# Patient Record
Sex: MALE | URBAN - METROPOLITAN AREA
[De-identification: names, ages, dates, MRNs, and addresses within clinical notes are randomized per-mention and may not be internally consistent; named-entity substitution may affect disease eponyms.]

---

## 2018-07-23 ENCOUNTER — RECORDS - HEALTHEAST (OUTPATIENT)
Dept: LAB | Facility: HOSPITAL | Age: 23
End: 2018-07-23

## 2018-07-24 LAB — HBV SURFACE AB SERPL IA-ACNC: POSITIVE M[IU]/ML

## 2018-07-25 LAB
GAMMA INTERFERON BACKGROUND BLD IA-ACNC: 0.12 IU/ML
M TB IFN-G BLD-IMP: NEGATIVE
MITOGEN IGNF BCKGRD COR BLD-ACNC: 0.02 IU/ML
MITOGEN IGNF BCKGRD COR BLD-ACNC: 0.02 IU/ML
QTF INTERPRETATION: NORMAL
QTF MITOGEN - NIL: >10 IU/ML

## 2018-09-11 ENCOUNTER — NURSE TRIAGE (OUTPATIENT)
Dept: NURSING | Facility: CLINIC | Age: 23
End: 2018-09-11

## 2018-09-12 NOTE — TELEPHONE ENCOUNTER
"Reports a \"pretty big stye\" on L upper eyelid. Says it has been present for about 1 month and is very painful.  Denies redness or swelling of the rest of the eyelid or the lower lid. Has not checked temp. Says he has blurred vision since he got this stye. Advised see provider within 4 hours per guideline. Disc'd he will need to go to a hospital ED; all other facilities closed now (11:15 pm) until tomorrow AM Pt asked if there would be a doctor at the ED that could remove the stye. Advised pt it is  unlikely that the stye can be removed at the ED but he should be seen in case an abx or drainage is needed. If removal is needed he would likely be referred to a surgeon. Pt voiced understanding and agreement.Ernestine Fallon RN/FNA      Reason for Disposition    [1] Blurred vision AND [2] new or worsening    Additional Information    Negative: Patient sounds very sick or weak to the triager    Negative: [1] Eyelid is red AND [2] fever    Negative: [1] Eyelid is swollen AND [2] fever    Negative: Redness spreads around the eye (both upper and lower eyelid are red)    Protocols used: STY-ADULT-    "

## 2018-12-27 ENCOUNTER — HOSPITAL ENCOUNTER (EMERGENCY)
Facility: CLINIC | Age: 23
Discharge: PSYCHIATRIC OR SUBSTANCE USE FACILITY | End: 2018-12-27
Attending: EMERGENCY MEDICINE | Admitting: EMERGENCY MEDICINE
Payer: MEDICAID

## 2018-12-27 ENCOUNTER — HOSPITAL ENCOUNTER (INPATIENT)
Facility: CLINIC | Age: 23
LOS: 1 days | Discharge: NURSING, REHAB OR LONG-TERM CARE | End: 2018-12-28
Attending: PSYCHIATRY & NEUROLOGY | Admitting: PSYCHIATRY & NEUROLOGY
Payer: MEDICAID

## 2018-12-27 VITALS
SYSTOLIC BLOOD PRESSURE: 133 MMHG | HEART RATE: 75 BPM | HEIGHT: 74 IN | OXYGEN SATURATION: 98 % | TEMPERATURE: 98.1 F | RESPIRATION RATE: 18 BRPM | DIASTOLIC BLOOD PRESSURE: 89 MMHG

## 2018-12-27 DIAGNOSIS — F33.1 MAJOR DEPRESSIVE DISORDER, RECURRENT EPISODE, MODERATE (H): Primary | ICD-10-CM

## 2018-12-27 DIAGNOSIS — T65.92XA SUICIDE ATTEMPT BY SUBSTANCE OVERDOSE, INITIAL ENCOUNTER (H): ICD-10-CM

## 2018-12-27 LAB
ALBUMIN SERPL-MCNC: 3.9 G/DL (ref 3.4–5)
ALP SERPL-CCNC: 101 U/L (ref 40–150)
ALT SERPL W P-5'-P-CCNC: 17 U/L (ref 0–70)
AMPHETAMINES UR QL SCN: NEGATIVE
ANION GAP SERPL CALCULATED.3IONS-SCNC: 9 MMOL/L (ref 3–14)
APAP SERPL-MCNC: <2 MG/L (ref 10–20)
AST SERPL W P-5'-P-CCNC: 17 U/L (ref 0–45)
BARBITURATES UR QL: NEGATIVE
BASOPHILS # BLD AUTO: 0 10E9/L (ref 0–0.2)
BASOPHILS NFR BLD AUTO: 0.5 %
BENZODIAZ UR QL: NEGATIVE
BILIRUB SERPL-MCNC: 2.2 MG/DL (ref 0.2–1.3)
BUN SERPL-MCNC: 10 MG/DL (ref 7–30)
CALCIUM SERPL-MCNC: 8.9 MG/DL (ref 8.5–10.1)
CANNABINOIDS UR QL SCN: POSITIVE
CHLORIDE SERPL-SCNC: 106 MMOL/L (ref 94–109)
CO2 SERPL-SCNC: 26 MMOL/L (ref 20–32)
COCAINE UR QL: NEGATIVE
CREAT SERPL-MCNC: 0.89 MG/DL (ref 0.66–1.25)
DIFFERENTIAL METHOD BLD: NORMAL
EOSINOPHIL # BLD AUTO: 0.2 10E9/L (ref 0–0.7)
EOSINOPHIL NFR BLD AUTO: 3.9 %
ERYTHROCYTE [DISTWIDTH] IN BLOOD BY AUTOMATED COUNT: 12 % (ref 10–15)
GFR SERPL CREATININE-BSD FRML MDRD: >90 ML/MIN/{1.73_M2}
GLUCOSE SERPL-MCNC: 100 MG/DL (ref 70–99)
HCT VFR BLD AUTO: 44.4 % (ref 40–53)
HGB BLD-MCNC: 15.5 G/DL (ref 13.3–17.7)
IMM GRANULOCYTES # BLD: 0 10E9/L (ref 0–0.4)
IMM GRANULOCYTES NFR BLD: 0.2 %
INTERPRETATION ECG - MUSE: NORMAL
LYMPHOCYTES # BLD AUTO: 2.1 10E9/L (ref 0.8–5.3)
LYMPHOCYTES NFR BLD AUTO: 36.3 %
MCH RBC QN AUTO: 31.1 PG (ref 26.5–33)
MCHC RBC AUTO-ENTMCNC: 34.9 G/DL (ref 31.5–36.5)
MCV RBC AUTO: 89 FL (ref 78–100)
MONOCYTES # BLD AUTO: 0.5 10E9/L (ref 0–1.3)
MONOCYTES NFR BLD AUTO: 8.7 %
NEUTROPHILS # BLD AUTO: 2.8 10E9/L (ref 1.6–8.3)
NEUTROPHILS NFR BLD AUTO: 50.4 %
OPIATES UR QL SCN: POSITIVE
PCP UR QL SCN: NEGATIVE
PLATELET # BLD AUTO: 223 10E9/L (ref 150–450)
POTASSIUM SERPL-SCNC: 4 MMOL/L (ref 3.4–5.3)
PROT SERPL-MCNC: 7.1 G/DL (ref 6.8–8.8)
RBC # BLD AUTO: 4.98 10E12/L (ref 4.4–5.9)
SALICYLATES SERPL-MCNC: <2 MG/DL
SODIUM SERPL-SCNC: 141 MMOL/L (ref 133–144)
WBC # BLD AUTO: 5.6 10E9/L (ref 4–11)

## 2018-12-27 PROCEDURE — 12400007 ZZH R&B MH INTERMEDIATE UMMC

## 2018-12-27 PROCEDURE — 25000132 ZZH RX MED GY IP 250 OP 250 PS 637: Performed by: PSYCHIATRY & NEUROLOGY

## 2018-12-27 PROCEDURE — 80307 DRUG TEST PRSMV CHEM ANLYZR: CPT | Performed by: EMERGENCY MEDICINE

## 2018-12-27 PROCEDURE — 93005 ELECTROCARDIOGRAM TRACING: CPT

## 2018-12-27 PROCEDURE — 25000132 ZZH RX MED GY IP 250 OP 250 PS 637: Performed by: NURSE PRACTITIONER

## 2018-12-27 PROCEDURE — 25000132 ZZH RX MED GY IP 250 OP 250 PS 637: Performed by: EMERGENCY MEDICINE

## 2018-12-27 PROCEDURE — 80053 COMPREHEN METABOLIC PANEL: CPT | Performed by: EMERGENCY MEDICINE

## 2018-12-27 PROCEDURE — 85025 COMPLETE CBC W/AUTO DIFF WBC: CPT | Performed by: EMERGENCY MEDICINE

## 2018-12-27 PROCEDURE — 99207 ZZC CDG-CODE CATEGORY CHANGED: CPT | Performed by: PSYCHIATRY & NEUROLOGY

## 2018-12-27 PROCEDURE — 99285 EMERGENCY DEPT VISIT HI MDM: CPT | Mod: 25

## 2018-12-27 PROCEDURE — 90791 PSYCH DIAGNOSTIC EVALUATION: CPT

## 2018-12-27 PROCEDURE — 80329 ANALGESICS NON-OPIOID 1 OR 2: CPT | Performed by: EMERGENCY MEDICINE

## 2018-12-27 PROCEDURE — 99236 HOSP IP/OBS SAME DATE HI 85: CPT | Performed by: PSYCHIATRY & NEUROLOGY

## 2018-12-27 RX ORDER — ESCITALOPRAM OXALATE 5 MG/1
5 TABLET ORAL DAILY
Status: DISCONTINUED | OUTPATIENT
Start: 2018-12-27 | End: 2018-12-28 | Stop reason: HOSPADM

## 2018-12-27 RX ORDER — OLANZAPINE 10 MG/2ML
10 INJECTION, POWDER, FOR SOLUTION INTRAMUSCULAR
Status: DISCONTINUED | OUTPATIENT
Start: 2018-12-27 | End: 2018-12-28 | Stop reason: HOSPADM

## 2018-12-27 RX ORDER — BISACODYL 10 MG
10 SUPPOSITORY, RECTAL RECTAL DAILY PRN
Status: DISCONTINUED | OUTPATIENT
Start: 2018-12-27 | End: 2018-12-28 | Stop reason: HOSPADM

## 2018-12-27 RX ORDER — TRAZODONE HYDROCHLORIDE 50 MG/1
50 TABLET, FILM COATED ORAL
Status: DISCONTINUED | OUTPATIENT
Start: 2018-12-27 | End: 2018-12-28 | Stop reason: HOSPADM

## 2018-12-27 RX ORDER — NALOXONE HYDROCHLORIDE 0.4 MG/ML
.1-.4 INJECTION, SOLUTION INTRAMUSCULAR; INTRAVENOUS; SUBCUTANEOUS
Status: DISCONTINUED | OUTPATIENT
Start: 2018-12-27 | End: 2018-12-28 | Stop reason: HOSPADM

## 2018-12-27 RX ORDER — ALUMINA, MAGNESIA, AND SIMETHICONE 2400; 2400; 240 MG/30ML; MG/30ML; MG/30ML
30 SUSPENSION ORAL EVERY 4 HOURS PRN
Status: DISCONTINUED | OUTPATIENT
Start: 2018-12-27 | End: 2018-12-28 | Stop reason: HOSPADM

## 2018-12-27 RX ORDER — ESCITALOPRAM OXALATE 5 MG/1
5 TABLET ORAL DAILY
Qty: 30 TABLET | Refills: 0 | Status: SHIPPED | OUTPATIENT
Start: 2018-12-27 | End: 2019-01-26

## 2018-12-27 RX ORDER — ACETAMINOPHEN 325 MG/1
650 TABLET ORAL ONCE
Status: COMPLETED | OUTPATIENT
Start: 2018-12-27 | End: 2018-12-27

## 2018-12-27 RX ORDER — NICOTINE 21 MG/24HR
1 PATCH, TRANSDERMAL 24 HOURS TRANSDERMAL DAILY
Status: DISCONTINUED | OUTPATIENT
Start: 2018-12-27 | End: 2018-12-27

## 2018-12-27 RX ORDER — ACETAMINOPHEN 325 MG/1
650 TABLET ORAL EVERY 4 HOURS PRN
Status: DISCONTINUED | OUTPATIENT
Start: 2018-12-27 | End: 2018-12-28 | Stop reason: HOSPADM

## 2018-12-27 RX ORDER — OLANZAPINE 10 MG/1
10 TABLET ORAL
Status: DISCONTINUED | OUTPATIENT
Start: 2018-12-27 | End: 2018-12-28 | Stop reason: HOSPADM

## 2018-12-27 RX ORDER — LANOLIN ALCOHOL/MO/W.PET/CERES
3 CREAM (GRAM) TOPICAL
Status: DISCONTINUED | OUTPATIENT
Start: 2018-12-27 | End: 2018-12-27 | Stop reason: HOSPADM

## 2018-12-27 RX ORDER — HYDROXYZINE HYDROCHLORIDE 25 MG/1
25 TABLET, FILM COATED ORAL EVERY 4 HOURS PRN
Status: DISCONTINUED | OUTPATIENT
Start: 2018-12-27 | End: 2018-12-28 | Stop reason: HOSPADM

## 2018-12-27 RX ORDER — BUPRENORPHINE 2 MG/1
2 TABLET SUBLINGUAL 2 TIMES DAILY
Status: COMPLETED | OUTPATIENT
Start: 2018-12-27 | End: 2018-12-28

## 2018-12-27 RX ADMIN — ESCITALOPRAM 5 MG: 5 TABLET, FILM COATED ORAL at 16:14

## 2018-12-27 RX ADMIN — MELATONIN 3 MG: 3 TAB ORAL at 04:00

## 2018-12-27 RX ADMIN — BUPRENORPHINE HYDROCHLORIDE 2 MG: 2 TABLET SUBLINGUAL at 22:00

## 2018-12-27 RX ADMIN — NICOTINE POLACRILEX 2 MG: 2 GUM, CHEWING BUCCAL at 20:21

## 2018-12-27 RX ADMIN — TRAZODONE HYDROCHLORIDE 50 MG: 50 TABLET ORAL at 22:00

## 2018-12-27 RX ADMIN — Medication 4 MG: at 18:23

## 2018-12-27 RX ADMIN — NICOTINE POLACRILEX 4 MG: 2 GUM, CHEWING BUCCAL at 22:00

## 2018-12-27 RX ADMIN — ACETAMINOPHEN 650 MG: 325 TABLET, FILM COATED ORAL at 01:23

## 2018-12-27 RX ADMIN — NICOTINE 1 PATCH: 21 PATCH, EXTENDED RELEASE TRANSDERMAL at 16:14

## 2018-12-27 RX ADMIN — BUPRENORPHINE HYDROCHLORIDE 2 MG: 2 TABLET SUBLINGUAL at 17:05

## 2018-12-27 SDOH — HEALTH STABILITY: MENTAL HEALTH: HOW OFTEN DO YOU HAVE A DRINK CONTAINING ALCOHOL?: NEVER

## 2018-12-27 ASSESSMENT — ACTIVITIES OF DAILY LIVING (ADL)
RETIRED_COMMUNICATION: 0-->UNDERSTANDS/COMMUNICATES WITHOUT DIFFICULTY
AMBULATION: 0-->INDEPENDENT
TOILETING: 0-->INDEPENDENT
COGNITION: 0 - NO COGNITION ISSUES REPORTED
HYGIENE/GROOMING: INDEPENDENT
DRESS: 0-->INDEPENDENT
LAUNDRY: WITH SUPERVISION
RETIRED_EATING: 0-->INDEPENDENT
ORAL_HYGIENE: INDEPENDENT
SWALLOWING: 0-->SWALLOWS FOODS/LIQUIDS WITHOUT DIFFICULTY
BATHING: 0-->INDEPENDENT
FALL_HISTORY_WITHIN_LAST_SIX_MONTHS: NO
DRESS: INDEPENDENT
TRANSFERRING: 0-->INDEPENDENT

## 2018-12-27 ASSESSMENT — ENCOUNTER SYMPTOMS
LIGHT-HEADEDNESS: 1
ABDOMINAL PAIN: 1

## 2018-12-27 ASSESSMENT — MIFFLIN-ST. JEOR: SCORE: 1783.24
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
## 2018-12-27 NOTE — ED NOTES
"I assumed care of pt at this time. Per report, pt took a total of 6 pills of 10 MG Oxycodone in an attempt to harm himself and left a note for his daughter. Pt was placed on a health officer hold. Pt has remained VSS and has been ambulating in the [facility] area with a steady gait. Pt has been calm and compliant with care and will make his wishes known as needed. Pt would like something to help him sleep at this time but is waiting to talk to DEC.  To be completed: Pt has not given a urine sample yet.  Current Vitals: BP (!) 149/107   Pulse 117   Temp 98.1  F (36.7  C) (Oral)   Resp 18   Ht 1.88 m (6' 2\")   SpO2 96%   Tele DEC: On the waiting list.  Belonging checklist: Completed.  Disposition: Not determined at this time.  "

## 2018-12-27 NOTE — PROGRESS NOTES
12/27/18 0600   Patient Belongings   Did you bring any home meds/supplements to the hospital?  No   Patient Belongings locker   Patient Belongings Put in Hospital Secure Location (Security or Locker, etc.) cash/credit card;clothing;keys;wallet;shoes;cell phone/electronics;money (see comment)   Belongings Search Yes   Clothing Search Yes   Second Staff Chai HYDE   Comment Envelope #[id number] sent to security     Items kept in storage  Jacket, Pants with string, Shoes, Wallet, Cell phone, Set of keys,     Items sent to security  [place] Drivers License, 5 Visa's, 2 Master Card, 1 Capital one, Social Security Card, $ 5.00 (five dollars)    Admission Signature    Patient Signature      __________________    Staff Signature      ___________________    Discharge Signature    All my personal items were returned to me    Patient Signature      _________________    Staff Signature      _________________

## 2018-12-27 NOTE — ED NOTES
"Pt was informed that he will be admitted to [facility] and asked to sign his agreement for transportation to [facility] ([facility] form). Pt became agitated stating that he doesn't want to go to [facility] and wants a bed here at [facility]. Pt demanding to speak with \"the psychologist that I just spoke to before I can make a decision on what to do.\" Pt also states that he wants to contact his family, but not at the moment \"because it 4 in the morning and they are sleeping.\"  Pt informed that MD will be notified. Pt states \"okay\".  "

## 2018-12-27 NOTE — ED NOTES
Bed: [facility]  Expected date:   Expected time:   Means of arrival:   Comments:  Triage
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
## 2018-12-27 NOTE — ED NOTES
Tele DEC assessment in progress.  Una Moffett RN,.......................................... 12/27/2018   3:20 AM

## 2018-12-27 NOTE — PROGRESS NOTES
I will be off tomorrow and have tried to find resources for pt without having insurance.  Pt tells me he has been living in his car in [place].  I will pass info on to coverage worker to finish this AVS.         Behavioral Discharge Planning and Instructions      Summary:  You were admitted on 12/27/2018  due to taking oxycodone in a possible suicide attempt.  You were treated by Dr. Rajesh Alfaro MD. You reported that you did not want to die, you just wanted to sleep to ease the emotional pain that you have been having.  You were discharged on 12/27/2018 from [facility] to Shelter Services at the [facility].      Principal Diagnosis:           Health Care Follow-up Appointments:      You came in without any insurance.   You met with a Financial Counselor, Ingrid Del Castillo. The Financial Counseling Office is [phone]. Since you had not started working yet, your income was submitted as 0 for your medical assistance application. After you start working and your income changes you will need to call [employer] @ [phone] and report the change in income. You are now eligible for medical assistance but it not yet activated.      You want outpatient substance use treatment. Walk in to this treatment center to get a Rule 25 assessment.  [facility] [address] [facility] [phone] Stand - By  Monday - Friday 7AM to 2PM         Dr. Alfaro showed you how to find a suboxone provider using the internet.      You can get therapy and medication management services at the local mental health center.  [facility]  [address]  2nd and [place]rd [place] [address]  General Information: [phone]  Appointments: [phone]  If you need to get in earlier, call the nurse line - [phone].      It is recommended that you call and schedule an appointment to establish care with a primary care provider.  [facility]  [address]  [place] [address]  Phone: [phone]      Attend all scheduled appointments with your outpatient providers. Call at least 24 hours in advance if you need to reschedule an appointment to ensure continued access to your outpatient providers.   Major Treatments, Procedures and Findings:  You were provided with: a psychiatric assessment, medication evaluation and/or management, group therapy and milieu management    Your drug screen was positive for opiates and cannabis.    Cannabinoids  Qual Urine Positive Abnormal   A  NEG^Negative  Final 12/27/2018  3:41 AM FrStHsLb     Opiates Qualitative Urine Positive Abnormal   A  NEG^Negative Final 12/27/2018  3:41 AM FrStHsLb         Symptoms to Report: mood getting worse or thoughts of suicide    Early warning signs can include: increased thoughts or behaviors of suicide or self-harm     Safety and Wellness:  Take all medicines as directed.  Make no changes unless your doctor suggests them.      Follow treatment recommendations.  Refrain from alcohol and non-prescribed drugs.  If there is a concern for safety, call 911.    Resources:   You want housing services. Walk in to this resource as soon as possible.  [facility]  At [address] in [facility]  Entrance on the intersection of [facility] [address]  Monday - Friday: 10:00am-5:30pm  Weekend and holidays: 1:00-5:30pm  [phone] - Nightly after 7:30 p.m. call [phone]      You were given a Handbook of the Streets to use as a Resource Guide.      COPE (Community Outreach for Psychiatric Emergencies): [phone]. s: 24 hours/day, 7 days/week  Services: Free and confidential telephone counseling provided by trained volunteers supervised by professional staff. Early intervention and brief supportive counseling for callers with issues of depression, stress and anxiety, domestic violence, parent/child conflicts, family issues, loneliness, grief and loss,  suicidal ideation and chronic mental illness.      [facility]  - [facility]  1.  [facility] Stabilization Program  [address] [place] [address]  Phone:[phone]    2.  [facility]  [address] [place] [address]  Phone: [phone]  This is a crisis residence where you can stay for a short time if you feel like you need to stabilize but do not need to go to the hospital.    3.  [facility]  [address] [place] [address]  Phone: [phone]        The treatment team has appreciated the opportunity to work with you.     If you have any questions or concerns our unit number is [phone]  You may be receiving a follow-up phone call within the next three days from a representative from behavioral health.    You have identified the best phone number to reach you

## 2018-12-27 NOTE — PLAN OF CARE
"Patient was admitted to [facility] for suicide attempt via overdose of 60 mg of Oxycodone. Patient reports abuse of opiates but denies any addiction and states he rarely uses \"maybe twice in the last 3 months before yesterday (12/26)\" but also requested Suboxone to alleviate withdrawal symptoms. Patient denies SI/SIB and hallucinations, states that he would \"never kill myself because I would go to hell.\" Patient states he \"just wanted to go to sleep\" but reports that he \"said goodbye\" to his mother. Recent stressors include separation from his wife, employment difficulties and unstable living situation.     Mental Health History: patient denies any prior mental health history and reports seeing a counselor briefly while a sophomore in college when he was \"stressed\" before his wedding.       Medical History: patient reports history of back pain for which he was prescribed opiates in the past and reports this is when he began abusing oxycodone, denies any pain at this time      Plan: Patient was cooperative with search and admission process. Patient was calm and presented with full range affect during admission interview. Patient was placed on suicide precautions and opiate withdrawal protocol. Patient states he is seeking \"a counselor or outside services.\" Patient is code 1 status 15. Will encourage patient to complete safety plan and participate in groups. Will encourage patient to take prescribed medications.   "

## 2018-12-27 NOTE — ED PROVIDER NOTES
"  History     Chief Complaint:  Overdose/Suicidal ideation    HPI   Domenic Hargrove is a 23 year old male who presents with overdose/suicidal ideation. The patient has had a bad year and notes having suicidal ideations for the first time ever this evening. He had smoked weed earlier today. This evening he had some Oxycodone (with no tylenol or other drugs) and decided to take 60 mg at 1900. He ended up falling asleep and texting his wife. Upon waking he decided to drive himself to the ED for evaluation and treatment out of concern for overdose. Upon arrival, he reports lightheadedness and abdominal pain. He denies any other drugs or alcohol use. He denies any pertinent history, allergies, or recent surgeries.    Allergies:  The patient has no known drug allergies.    Medications:    The patient denies any significant past medical history.    Past Medical History:    The patient denies any significant past medical history.    Past Surgical History:    The patient does not have any pertinent past surgical history.    Family History:    No past pertinent family history.    Social History:  Marital Status:  Unknown   Smoker:   Current   Smokeless:   Never   Alcohol:   No   Drugs:   No   Lives at:   Unknown   Arrives with:   No one   Clinic:    Unknown   Work:   Unknown     Review of Systems   Gastrointestinal: Positive for abdominal pain.   Neurological: Positive for light-headedness.   Psychiatric/Behavioral: Positive for suicidal ideas.   All other systems reviewed and are negative.    Physical Exam     Patient Vitals for the past 24 hrs:   BP Temp Temp src Pulse Heart Rate Resp SpO2 Height   12/27/18 0529 133/89 -- -- -- 75 -- 98 % --   12/27/18 0338 133/89 -- -- 75 -- 18 98 % --   12/27/18 0101 (!) 149/107 98.1  F (36.7  C) Oral 117 -- 18 96 % 1.88 m (6' 2\")     Physical Exam  General: Appears well-developed and well-nourished.   Head: No signs of trauma.   CV: Normal rate and regular rhythm.    Resp: Effort normal " and breath sounds normal. No respiratory distress.   GI: Soft. There is no tenderness.  No rebound or guarding.  Normal bowel sounds.    MSK: Normal range of motion.  Neuro: The patient is alert and oriented.  Strength in upper/lower extremities normal and symmetrical.   Sensation normal. Speech normal.  GCS 15  Skin: Skin is warm and dry. No rash noted.   Psych: Calm and cooperative      Emergency Department Course   ECG:  Indication: overdose  Time: 0146  Vent. Rate 89 bpm. IL interval 156. QRS duration 104. QT/QTc 348/423. P-R-T axis 74 82 68. Normal sinus rhythm. Voltage criteria for left ventricular hypertrophy. Abnormal ECG. Read time: 0150    Laboratory:  Drug abuse screen: cannabinoids Positive, opiates Positive    Salicylate level: <2  Acetaminophen: <2  CMP: Glucose 100, bilirubin 2.2, o/w WNL (Creatinine: 0.89)  CBC: WBC: 5.6, HGB: 15.5, PLT: 223    Interventions:  0123: Tylenol 650 mg PO     Emergency Department Course:  Nursing notes and vitals reviewed. (0105) I performed an exam of the patient as documented above.     IV inserted. Medicine administered as documented above. Blood drawn. This was sent to the lab for further testing, results above.    (0257) I consulted with DEC, regarding the patient's history and presentation here in the emergency department.  (0047)  I consulted with Dr. Alfaro of [facility]. They are in agreement to accept the patient for admission.     Findings and plan explained to the Patient who consents to admission. Discussed the patient with Dr. Alfaro, who will admit the patient to a psych bed for further monitoring, evaluation, and treatment.    Impression & Plan    Medical Decision Making:  Domenic Hargrove is a 23-year-old gentleman who presents after an intentional overdose.  The patient reports he has been dealing with a number of stressors and felt overwhelmed this evening.  He did intentionally take 6 - 10 mg tablets of oxycodone around 1900 this evening.   Apparently he had made some concerning statements to a family member.  The patient states that he had slept for a while after taking medications and when he woke up he was concerned about his actions and was brought himself to the ER.  Blood work was obtained that was unremarkable including a negative salicylate and Tylenol level.  The patient was clear that he had taken oxycodone without any Tylenol and given that the Tylenol was negative 6 hours after ingestion, I have a low concern for Tylenol toxicity.  DEC did evaluate the patient and ultimately decided to admit the patient for psychiatric evaluation.      Diagnosis:    ICD-10-CM    1. Suicide attempt by substance overdose, initial encounter (H) T65.92XA      Disposition:  Transferred to [facility] with Dr. Dominic Schuster Disclosure:  IMichele, am serving as a scribe on 12/27/2018 at 1:05 AM to personally document services performed by Justin Vasquez MD based on my observations and the provider's statements to me.     Michele Romano  12/27/2018    EMERGENCY DEPARTMENT       Justin Vasquez MD  12/28/18 0555

## 2018-12-28 VITALS
DIASTOLIC BLOOD PRESSURE: 80 MMHG | HEIGHT: 75 IN | TEMPERATURE: 97.2 F | HEART RATE: 74 BPM | SYSTOLIC BLOOD PRESSURE: 124 MMHG | WEIGHT: 156 LBS | BODY MASS INDEX: 19.4 KG/M2 | RESPIRATION RATE: 16 BRPM

## 2018-12-28 LAB
CHOLEST SERPL-MCNC: 152 MG/DL
HDLC SERPL-MCNC: 70 MG/DL
LDLC SERPL CALC-MCNC: 68 MG/DL
NONHDLC SERPL-MCNC: 82 MG/DL
T4 FREE SERPL-MCNC: 0.96 NG/DL (ref 0.76–1.46)
TRIGL SERPL-MCNC: 68 MG/DL
TSH SERPL DL<=0.005 MIU/L-ACNC: 0.35 MU/L (ref 0.4–4)

## 2018-12-28 PROCEDURE — 25000132 ZZH RX MED GY IP 250 OP 250 PS 637: Performed by: PSYCHIATRY & NEUROLOGY

## 2018-12-28 PROCEDURE — 84439 ASSAY OF FREE THYROXINE: CPT | Performed by: NURSE PRACTITIONER

## 2018-12-28 PROCEDURE — 84443 ASSAY THYROID STIM HORMONE: CPT | Performed by: NURSE PRACTITIONER

## 2018-12-28 PROCEDURE — 80061 LIPID PANEL: CPT | Performed by: NURSE PRACTITIONER

## 2018-12-28 PROCEDURE — 36415 COLL VENOUS BLD VENIPUNCTURE: CPT | Performed by: NURSE PRACTITIONER

## 2018-12-28 RX ADMIN — ESCITALOPRAM 5 MG: 5 TABLET, FILM COATED ORAL at 09:09

## 2018-12-28 RX ADMIN — BUPRENORPHINE HYDROCHLORIDE 2 MG: 2 TABLET SUBLINGUAL at 09:09

## 2018-12-28 RX ADMIN — NICOTINE POLACRILEX 4 MG: 2 GUM, CHEWING BUCCAL at 09:11

## 2018-12-28 NOTE — PLAN OF CARE
"Pt was discharged from stn: 30.  At the time of discharge, pt denies suicidal ideation and stated \"I will never do anything to harm myself\".  Pt has received all his  belongings.   "

## 2018-12-28 NOTE — PROGRESS NOTES
CTC: Obtained YUE from pt for psychiatry and psychotherapy follow-up. Pt reported he will call to schedule this appointment

## 2018-12-28 NOTE — DISCHARGE INSTRUCTIONS
Behavioral Discharge Planning and Instructions      Summary:  You were admitted on 12/27/2018  due to taking oxycodone in a possible suicide attempt.  You were treated by Dr. Rajesh Alfaro MD. You reported that you did not want to die, you just wanted to sleep to ease the emotional pain that you have been having.  You were discharged on 12/27/2018 from [facility] to Shelter Services at the [facility].    Principal Diagnosis:   Major depressive disorder-recurrent, moderate  Generalized anxiety disorder  Opiate use disorder, moderate  Cannabis use disorder, moderate      Health Care Follow-up Appointments:      You came in without any insurance.   You met with a Financial Counselor, Ingrid Del Castillo. The Financial Counseling Office is [phone]. Since you had not started working yet, your income was submitted as 0 for your medical assistance application. After you start working and your income changes you will need to call [employer] @ [phone] and report the change in income. You are now eligible for medical assistance but it not yet activated.    You want outpatient substance use treatment. Walk in to this treatment center to get a Rule 25 assessment.  [facility] [address] [facility] [phone] Stand - By  Monday - Friday 7AM to 2PM       Dr. Alfaro showed you how to find a suboxone provider using the internet.    You can get therapy and medication management services at the local mental health center.  [facility]  [address]  2nd and [place]rd [place] [address].    Mental Health Follow-Up:  [facility]   [place]: [address] [place] [address], [address] - [phone]  [place]: [address] [facility] - [phone]  Pt called and scheduled the following appointment:      [facility]  [phone]  [facility] crisis intervention program (24-hour hotline).   [facility]  [phone]    [address]  Outpatient mental health services for people covered by [place] [facility] Care Programs or private insurance. People without insurance may be eligible to receive services on a sliding fee schedule.    General Information: [phone]  Appointments: [phone]  If you need to get in earlier, call the nurse line - [phone].      It is recommended that you call and schedule an appointment to establish care with a primary care provider.  [facility]  [address] [place] [address]  Phone: [phone]      Attend all scheduled appointments with your outpatient providers. Call at least 24 hours in advance if you need to reschedule an appointment to ensure continued access to your outpatient providers.   Major Treatments, Procedures and Findings:  You were provided with: a psychiatric assessment, medication evaluation and/or management, group therapy and milieu management    Your drug screen was positive for opiates and cannabis.    Cannabinoids  Qual Urine Positive Abnormal   A  NEG^Negative  Final 12/27/2018  3:41 AM FrStHsLb     Opiates Qualitative Urine Positive Abnormal   A  NEG^Negative Final 12/27/2018  3:41 AM FrStHsLb         Symptoms to Report: mood getting worse or thoughts of suicide    Early warning signs can include: increased thoughts or behaviors of suicide or self-harm     Safety and Wellness:  Take all medicines as directed.  Make no changes unless your doctor suggests them.      Follow treatment recommendations.  Refrain from alcohol and non-prescribed drugs.  If there is a concern for safety, call 101.    Resources:   You want housing services. Walk in to this resource as soon as possible.  [facility]  At [address] in [facility]  Entrance on the intersection of [address]  Monday - Friday: 10:00am-5:30pm  Weekend and holidays: 1:00-5:30pm  [phone] - Nightly after  7:30 p.m. call [phone]      You were given a Handbook of the Streets to use as a Resource Guide.      COPE (Community Outreach for Psychiatric Emergencies): [phone]. s: 24 hours/day, 7 days/week  Services: Free and confidential telephone counseling provided by trained volunteers supervised by professional staff. Early intervention and brief supportive counseling for callers with issues of depression, stress and anxiety, domestic violence, parent/child conflicts, family issues, loneliness, grief and loss, suicidal ideation and chronic mental illness.      [facility]  - [facility]  1.  [facility] Crisis Stabilization Program  [address] [place] [address]  Phone:[phone]    2.  [facility]  [address] [place] [address]  Phone: [phone]  This is a crisis residence where you can stay for a short time if you feel like you need to stabilize but do not need to go to the hospital.    3.  [facility]  [address] [place] [address]  Phone: [phone]        The treatment team has appreciated the opportunity to work with you.     If you have any questions or concerns our unit number is [phone]  You may be receiving a follow-up phone call within the next three days from a representative from behavioral health.    You have identified the best phone number to reach you as [phone]

## 2018-12-28 NOTE — PROGRESS NOTES
patient denied most issues, including suicidality. He did voice a desire to go to sleep and, but it seemed more a wish for despair to end than to actually end his life.       12/27/18 2188   Behavioral Health   Hallucinations denies / not responding to hallucinations   Thinking intact   Orientation person: oriented;place: oriented;date: oriented;time: oriented   Memory baseline memory   Insight poor   Judgement impaired   Eye Contact at examiner   Affect full range affect   Mood mood is calm   Physical Appearance/Attire appears stated age;attire appropriate to age and situation   Hygiene well groomed   1. Wish to be Dead No   2. Non-Specific Active Suicidal Thoughts  No   Activity other (see comment)  (Social with peers)   Speech clear;coherent   Medication Sensitivity no stated side effects;no observed side effects   Psychomotor / Gait balanced;steady   Psycho Education   Type of Intervention 1:1 intervention   Response participates, initiates socially appropriate   Hours 0.5   Activities of Daily Living   Hygiene/Grooming independent   Oral Hygiene independent   Dress independent   Laundry with supervision   Room Organization independent

## 2019-01-28 ENCOUNTER — APPOINTMENT (OUTPATIENT)
Dept: GENERAL RADIOLOGY | Facility: CLINIC | Age: 24
End: 2019-01-28

## 2019-01-28 ENCOUNTER — HOSPITAL ENCOUNTER (EMERGENCY)
Facility: CLINIC | Age: 24
Discharge: HOME | End: 2019-01-28
Attending: EMERGENCY MEDICINE | Admitting: EMERGENCY MEDICINE

## 2019-01-28 VITALS
HEART RATE: 69 BPM | OXYGEN SATURATION: 100 % | SYSTOLIC BLOOD PRESSURE: 130 MMHG | DIASTOLIC BLOOD PRESSURE: 86 MMHG | TEMPERATURE: 97.7 F | RESPIRATION RATE: 16 BRPM

## 2019-01-28 DIAGNOSIS — M79.662 PAIN IN LEFT SHIN: ICD-10-CM
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
## 2019-01-28 DIAGNOSIS — M25.512 ACUTE PAIN OF LEFT SHOULDER: ICD-10-CM

## 2019-01-28 PROCEDURE — 99283 EMERGENCY DEPT VISIT LOW MDM: CPT

## 2019-01-28 PROCEDURE — 73030 X-RAY EXAM OF SHOULDER: CPT | Mod: LT

## 2019-01-28 PROCEDURE — 25000132 ZZH RX MED GY IP 250 OP 250 PS 637: Performed by: EMERGENCY MEDICINE

## 2019-01-28 PROCEDURE — 73590 X-RAY EXAM OF LOWER LEG: CPT | Mod: LT

## 2019-01-28 RX ORDER — OXYCODONE HYDROCHLORIDE 5 MG/1
5 TABLET ORAL ONCE
Status: COMPLETED | OUTPATIENT
Start: 2019-01-28 | End: 2019-01-28

## 2019-01-28 RX ORDER — OXYCODONE AND ACETAMINOPHEN 5; 325 MG/1; MG/1
1 TABLET ORAL ONCE
Status: COMPLETED | OUTPATIENT
Start: 2019-01-28 | End: 2019-01-28

## 2019-01-28 RX ORDER — ACETAMINOPHEN 500 MG
1000 TABLET ORAL ONCE
Status: DISCONTINUED | OUTPATIENT
Start: 2019-01-28 | End: 2019-01-28

## 2019-01-28 RX ADMIN — OXYCODONE HYDROCHLORIDE AND ACETAMINOPHEN 1 TABLET: 5; 325 TABLET ORAL at 22:37

## 2019-01-28 RX ADMIN — OXYCODONE HYDROCHLORIDE 5 MG: 5 TABLET ORAL at 22:42

## 2019-01-28 ASSESSMENT — ENCOUNTER SYMPTOMS
NAUSEA: 1
ARTHRALGIAS: 1
HEADACHES: 0
VOMITING: 0

## 2019-01-28 NOTE — LETTER
January 28, 2019      To Whom It May Concern:      Domenic Hargrove was seen in our Emergency Department today, 01/28/19.  I expect his condition to improve over the next 2 days.  He may return to work/school when improved.    Sincerely,        Tuan Landry RN

## 2019-01-28 NOTE — ED AVS SNAPSHOT
[facility] Emergency Department  [address]  [facility] [address]  Phone:  [phone]  Fax:  [phone]                                    Domenic Hargrove   MRN: [id number]    Department:  [facility] Emergency Department   Date of Visit:  1/28/2019           After Visit Summary Signature Page    I have received my discharge instructions, and my questions have been answered. I have discussed any challenges I see with this plan with the nurse or doctor.    ..........................................................................................................................................  Patient/Patient Representative Signature      ..........................................................................................................................................  Patient Representative Print Name and Relationship to Patient    ..................................................               ................................................  Date                                   Time    ..........................................................................................................................................  Reviewed by Signature/Title    ...................................................              ..............................................  Date                                               Time          22EPIC Rev 08/18
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
